# Patient Record
Sex: FEMALE
[De-identification: names, ages, dates, MRNs, and addresses within clinical notes are randomized per-mention and may not be internally consistent; named-entity substitution may affect disease eponyms.]

---

## 2017-11-01 ENCOUNTER — RESULT REVIEW (OUTPATIENT)
Age: 82
End: 2017-11-01

## 2018-06-09 ENCOUNTER — EMERGENCY (EMERGENCY)
Facility: HOSPITAL | Age: 83
LOS: 1 days | Discharge: HOME | End: 2018-06-09
Attending: EMERGENCY MEDICINE
Payer: MEDICARE

## 2018-06-09 VITALS
OXYGEN SATURATION: 100 % | WEIGHT: 160.06 LBS | HEART RATE: 101 BPM | DIASTOLIC BLOOD PRESSURE: 48 MMHG | HEIGHT: 64 IN | TEMPERATURE: 98 F | RESPIRATION RATE: 18 BRPM | SYSTOLIC BLOOD PRESSURE: 65 MMHG

## 2018-06-09 LAB
ANION GAP SERPL CALC-SCNC: 10 MMOL/L — SIGNIFICANT CHANGE UP (ref 5–17)
APTT BLD: 28.2 SEC — SIGNIFICANT CHANGE UP (ref 27.5–37.4)
BASOPHILS # BLD AUTO: 0.06 K/UL — SIGNIFICANT CHANGE UP (ref 0–0.2)
BASOPHILS NFR BLD AUTO: 0.6 % — SIGNIFICANT CHANGE UP (ref 0–2)
BUN SERPL-MCNC: 18 MG/DL — SIGNIFICANT CHANGE UP (ref 7–23)
CALCIUM SERPL-MCNC: 8.9 MG/DL — SIGNIFICANT CHANGE UP (ref 8.5–10.1)
CHLORIDE SERPL-SCNC: 104 MMOL/L — SIGNIFICANT CHANGE UP (ref 96–108)
CO2 SERPL-SCNC: 26 MMOL/L — SIGNIFICANT CHANGE UP (ref 22–31)
CREAT SERPL-MCNC: 1 MG/DL — SIGNIFICANT CHANGE UP (ref 0.5–1.3)
EOSINOPHIL # BLD AUTO: 0.49 K/UL — SIGNIFICANT CHANGE UP (ref 0–0.5)
EOSINOPHIL NFR BLD AUTO: 4.9 % — SIGNIFICANT CHANGE UP (ref 0–6)
GLUCOSE SERPL-MCNC: 167 MG/DL — HIGH (ref 70–99)
HCT VFR BLD CALC: 34.4 % — LOW (ref 34.5–45)
HGB BLD-MCNC: 11.9 G/DL — SIGNIFICANT CHANGE UP (ref 11.5–15.5)
IMM GRANULOCYTES NFR BLD AUTO: 0.5 % — SIGNIFICANT CHANGE UP (ref 0–1.5)
INR BLD: 0.96 RATIO — SIGNIFICANT CHANGE UP (ref 0.88–1.16)
LYMPHOCYTES # BLD AUTO: 3.11 K/UL — SIGNIFICANT CHANGE UP (ref 1–3.3)
LYMPHOCYTES # BLD AUTO: 31 % — SIGNIFICANT CHANGE UP (ref 13–44)
MCHC RBC-ENTMCNC: 30.4 PG — SIGNIFICANT CHANGE UP (ref 27–34)
MCHC RBC-ENTMCNC: 34.6 GM/DL — SIGNIFICANT CHANGE UP (ref 32–36)
MCV RBC AUTO: 87.8 FL — SIGNIFICANT CHANGE UP (ref 80–100)
MONOCYTES # BLD AUTO: 0.85 K/UL — SIGNIFICANT CHANGE UP (ref 0–0.9)
MONOCYTES NFR BLD AUTO: 8.5 % — SIGNIFICANT CHANGE UP (ref 2–14)
NEUTROPHILS # BLD AUTO: 5.47 K/UL — SIGNIFICANT CHANGE UP (ref 1.8–7.4)
NEUTROPHILS NFR BLD AUTO: 54.5 % — SIGNIFICANT CHANGE UP (ref 43–77)
NRBC # BLD: 0 /100 WBCS — SIGNIFICANT CHANGE UP (ref 0–0)
PLATELET # BLD AUTO: 303 K/UL — SIGNIFICANT CHANGE UP (ref 150–400)
POTASSIUM SERPL-MCNC: 3.9 MMOL/L — SIGNIFICANT CHANGE UP (ref 3.5–5.3)
POTASSIUM SERPL-SCNC: 3.9 MMOL/L — SIGNIFICANT CHANGE UP (ref 3.5–5.3)
PROTHROM AB SERPL-ACNC: 10.5 SEC — SIGNIFICANT CHANGE UP (ref 9.8–12.7)
RBC # BLD: 3.92 M/UL — SIGNIFICANT CHANGE UP (ref 3.8–5.2)
RBC # FLD: 13.3 % — SIGNIFICANT CHANGE UP (ref 10.3–14.5)
SODIUM SERPL-SCNC: 140 MMOL/L — SIGNIFICANT CHANGE UP (ref 135–145)
WBC # BLD: 10.03 K/UL — SIGNIFICANT CHANGE UP (ref 3.8–10.5)
WBC # FLD AUTO: 10.03 K/UL — SIGNIFICANT CHANGE UP (ref 3.8–10.5)

## 2018-06-09 PROCEDURE — 70450 CT HEAD/BRAIN W/O DYE: CPT | Mod: 26

## 2018-06-09 PROCEDURE — 72125 CT NECK SPINE W/O DYE: CPT | Mod: 26

## 2018-06-09 PROCEDURE — 12002 RPR S/N/AX/GEN/TRNK2.6-7.5CM: CPT

## 2018-06-09 PROCEDURE — 99284 EMERGENCY DEPT VISIT MOD MDM: CPT | Mod: 25

## 2018-06-09 RX ORDER — ASPIRIN/CALCIUM CARB/MAGNESIUM 324 MG
1 TABLET ORAL
Qty: 0 | Refills: 0 | COMMUNITY

## 2018-06-09 NOTE — ED ADULT NURSE NOTE - PMH
Aortic stenosis, moderate    Breast cancer  s/p left lumpectomy x 2, radiation 1999  Colon polyp    Diverticulosis    Dyslipidemia    Female incontinence  pt wears pessary  HTN (hypertension)    Murmur, cardiac    Osteopenia

## 2018-06-09 NOTE — ED ADULT NURSE NOTE - OBJECTIVE STATEMENT
Patient presents to ED with laceration to left side of head noted with dressing as stated she missed a step of stairs and sustained a laceration denies passing out accompanied by family waiting for MD evaluation.

## 2018-06-09 NOTE — ED ADULT TRIAGE NOTE - CHIEF COMPLAINT QUOTE
pt missed a concrete step and fell forward left scalp lac -LOC pt missed a concrete step and fell forward left scalp lac -LOC, near syncope in triage

## 2018-06-10 VITALS
SYSTOLIC BLOOD PRESSURE: 132 MMHG | DIASTOLIC BLOOD PRESSURE: 81 MMHG | TEMPERATURE: 98 F | HEART RATE: 98 BPM | RESPIRATION RATE: 16 BRPM | OXYGEN SATURATION: 100 %

## 2018-06-10 LAB
HCT VFR BLD CALC: 30.3 % — LOW (ref 34.5–45)
HGB BLD-MCNC: 10.5 G/DL — LOW (ref 11.5–15.5)
LACTATE SERPL-SCNC: 2 MMOL/L — SIGNIFICANT CHANGE UP (ref 0.7–2)
MCHC RBC-ENTMCNC: 30.4 PG — SIGNIFICANT CHANGE UP (ref 27–34)
MCHC RBC-ENTMCNC: 34.7 GM/DL — SIGNIFICANT CHANGE UP (ref 32–36)
MCV RBC AUTO: 87.8 FL — SIGNIFICANT CHANGE UP (ref 80–100)
NRBC # BLD: 0 /100 WBCS — SIGNIFICANT CHANGE UP (ref 0–0)
PLATELET # BLD AUTO: 264 K/UL — SIGNIFICANT CHANGE UP (ref 150–400)
RBC # BLD: 3.45 M/UL — LOW (ref 3.8–5.2)
RBC # FLD: 13.2 % — SIGNIFICANT CHANGE UP (ref 10.3–14.5)
WBC # BLD: 14.59 K/UL — HIGH (ref 3.8–10.5)
WBC # FLD AUTO: 14.59 K/UL — HIGH (ref 3.8–10.5)

## 2018-06-10 PROCEDURE — 85730 THROMBOPLASTIN TIME PARTIAL: CPT

## 2018-06-10 PROCEDURE — 99284 EMERGENCY DEPT VISIT MOD MDM: CPT | Mod: 25

## 2018-06-10 PROCEDURE — 80048 BASIC METABOLIC PNL TOTAL CA: CPT

## 2018-06-10 PROCEDURE — 85610 PROTHROMBIN TIME: CPT

## 2018-06-10 PROCEDURE — 12002 RPR S/N/AX/GEN/TRNK2.6-7.5CM: CPT

## 2018-06-10 PROCEDURE — 70450 CT HEAD/BRAIN W/O DYE: CPT

## 2018-06-10 PROCEDURE — 83605 ASSAY OF LACTIC ACID: CPT

## 2018-06-10 PROCEDURE — 36415 COLL VENOUS BLD VENIPUNCTURE: CPT

## 2018-06-10 PROCEDURE — 72125 CT NECK SPINE W/O DYE: CPT

## 2018-06-10 PROCEDURE — 85027 COMPLETE CBC AUTOMATED: CPT

## 2018-06-10 PROCEDURE — 96374 THER/PROPH/DIAG INJ IV PUSH: CPT

## 2018-06-10 RX ORDER — SODIUM CHLORIDE 9 MG/ML
500 INJECTION INTRAMUSCULAR; INTRAVENOUS; SUBCUTANEOUS ONCE
Qty: 0 | Refills: 0 | Status: COMPLETED | OUTPATIENT
Start: 2018-06-10 | End: 2018-06-10

## 2018-06-10 RX ADMIN — Medication 0.5 MILLIGRAM(S): at 00:50

## 2018-06-10 RX ADMIN — Medication 1 TABLET(S): at 01:23

## 2018-06-10 RX ADMIN — SODIUM CHLORIDE 1000 MILLILITER(S): 9 INJECTION INTRAMUSCULAR; INTRAVENOUS; SUBCUTANEOUS at 01:59

## 2018-06-10 RX ADMIN — SODIUM CHLORIDE 500 MILLILITER(S): 9 INJECTION INTRAMUSCULAR; INTRAVENOUS; SUBCUTANEOUS at 02:13

## 2018-06-10 NOTE — ED PROVIDER NOTE - EYES, MLM
Clear bilaterally, pupils equal, round and reactive to light. mild left supraorbital ecchymosis, no bony ttp or deformity

## 2018-06-10 NOTE — ED PROVIDER NOTE - MEDICAL DECISION MAKING DETAILS
never used pt with fall on street, no loc but laceration left frontal temporal forehead with pulsatile capillary bleeding. wound closed for hemastasis by me, pt to fu with plastics 1-2 days for definitive wound care, check ct head neg, check labs ro severe anemia, vss

## 2018-06-10 NOTE — ED PROVIDER NOTE - CARE PLAN
Principal Discharge DX:	Traumatic injury of head, initial encounter  Secondary Diagnosis:	Laceration of face with complication, initial encounter

## 2018-06-10 NOTE — ED PROVIDER NOTE - SKIN, MLM
Skin normal color for race, warm, dry 3cm gaping laceration lateral to left eyebrow and above with pulsatile blood flow controlled with pressure

## 2018-06-10 NOTE — ED PROVIDER NOTE - PSH
History of total knee replacement  s/p bilateral knee replacements  S/P breast lumpectomy  left breast lumpectomy with excision of axillary lymph nodes 1999  S/P carotid endarterectomy  left on 9/20/13  S/P tonsillectomy

## 2018-06-10 NOTE — ED PROVIDER NOTE - CHPI ED SYMPTOMS NEG
no loss of consciousness/no weakness/no vomiting/no tingling/no abrasion/no deformity/no fever/no confusion/no numbness

## 2018-06-10 NOTE — ED PROVIDER NOTE - NOTES
requested that I achieve hemostasis with pressure or sutures and abx and he will see pt in 1-2 days for definitive cosmetic repair.  instructed to give pt his cell #

## 2018-06-10 NOTE — ED PROVIDER NOTE - OBJECTIVE STATEMENT
Pt is a 83 yo female on asa. pt with hx of as. pt sp trip and fall in street and hit her head on concrete. no loc, neck pain, numbness, weakness, n/v or any other sx.  pt with heavy bleeding from left side of head, wrapped in field. pt feels mildly lighheaded.  bleeding controlled with pressure, no other co

## 2018-06-10 NOTE — ED PROVIDER NOTE - PROGRESS NOTE DETAILS
pt lactate only 2 after described heavy bleeding. hgb decreased 1 gram after 1 liter ivf.  will give 500cc more ns and if pt not dizzy and normal orthostatics, will d/c

## 2019-12-03 ENCOUNTER — RESULT REVIEW (OUTPATIENT)
Age: 84
End: 2019-12-03

## 2022-11-15 ENCOUNTER — OUTPATIENT (OUTPATIENT)
Dept: OUTPATIENT SERVICES | Facility: HOSPITAL | Age: 87
LOS: 1 days | Discharge: HOME | End: 2022-11-15

## 2022-11-15 DIAGNOSIS — D64.9 ANEMIA, UNSPECIFIED: ICD-10-CM

## 2023-02-05 ENCOUNTER — OUTPATIENT (OUTPATIENT)
Dept: OUTPATIENT SERVICES | Facility: HOSPITAL | Age: 88
LOS: 1 days | Discharge: HOME | End: 2023-02-05

## 2023-02-05 DIAGNOSIS — D64.9 ANEMIA, UNSPECIFIED: ICD-10-CM

## 2023-02-13 ENCOUNTER — APPOINTMENT (OUTPATIENT)
Dept: HEMATOLOGY ONCOLOGY | Facility: CLINIC | Age: 88
End: 2023-02-13
Payer: MEDICARE

## 2023-02-13 VITALS
WEIGHT: 161.14 LBS | HEART RATE: 103 BPM | BODY MASS INDEX: 29.28 KG/M2 | RESPIRATION RATE: 17 BRPM | OXYGEN SATURATION: 99 % | TEMPERATURE: 97.3 F | HEIGHT: 62.2 IN

## 2023-02-13 VITALS — DIASTOLIC BLOOD PRESSURE: 99 MMHG | SYSTOLIC BLOOD PRESSURE: 170 MMHG

## 2023-02-13 DIAGNOSIS — I50.32 CHRONIC DIASTOLIC (CONGESTIVE) HEART FAILURE: ICD-10-CM

## 2023-02-13 DIAGNOSIS — C50.919 MALIGNANT NEOPLASM OF UNSPECIFIED SITE OF UNSPECIFIED FEMALE BREAST: ICD-10-CM

## 2023-02-13 DIAGNOSIS — K21.9 GASTRO-ESOPHAGEAL REFLUX DISEASE W/OUT ESOPHAGITIS: ICD-10-CM

## 2023-02-13 DIAGNOSIS — M15.9 POLYOSTEOARTHRITIS, UNSPECIFIED: ICD-10-CM

## 2023-02-13 DIAGNOSIS — K58.0 IRRITABLE BOWEL SYNDROME WITH DIARRHEA: ICD-10-CM

## 2023-02-13 PROCEDURE — 99213 OFFICE O/P EST LOW 20 MIN: CPT

## 2023-02-13 NOTE — ASSESSMENT
[FreeTextEntry1] : 88 yo woman with history of Left breast invasive carcinoma in 1999; treated with radiation only; no endocrine therapy prescribed at that time.\par \par 12/2019 found to have new Left breast Invasive carcinoma; s/p lumpectomy and started on anastrazole by 4/2020.\par \par Overall doing very well with improved range of motion: continue to remain active and symptoms will continue but will be less prominent \par \par - plan to continue endocrine thearpy for 7-10 years\par - last bone densily - patient thinks she might have in in [facility] pharmacy but unsure when; will obtain repors\par - breast imaing as per Dr. Ortiz; reviewed mammo/sono from 10/3/23 by which time, plan for repeat in 10/2023\par - labs today including chem panel and Vitamin D levels\par - Patient had the opportunity to have all their questions answered to their satisfaction \par - follow up in 6 months \par

## 2023-02-13 NOTE — HISTORY OF PRESENT ILLNESS
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 She underwent lumpectomy but no RT due to prior exposure to radiation of the left chest wall. Started endocrine therapy with anastrazole in 4/2020 with recommendation to continue at least  for 5 years. \par \par Transferring care from [facility] to  [facility] as of 2/2023. [de-identified] : ER+AZ+ her2 negative [de-identified] : 2/13/2023\par All of the patient's prior records including radiology, pathology and prior notes reviewed; Past Medical History, Past Surgical History, Family History and Social history reviewed and updated in the patient's chart.\par \par Labs mammo/sono 10/2022 - [facility]\par Bone Denisty ??? at [facility]\par Tolerating anastrazole - although she does have some joint complaints

## 2023-02-13 NOTE — PHYSICAL EXAM
[Normal] : affect appropriate [de-identified] : well healed left breast incision; no palpable nodularity, no nipple retraction no skin dimpling; right breast with no new palpable findings [de-identified] : shotty posteior  and anterior cervical LNs

## 2023-05-14 RX ORDER — ANASTROZOLE TABLETS 1 MG/1
1 TABLET ORAL
Qty: 90 | Refills: 1 | Status: ACTIVE | COMMUNITY
Start: 2022-10-17 | End: 1900-01-01

## 2023-08-15 ENCOUNTER — APPOINTMENT (OUTPATIENT)
Dept: HEMATOLOGY ONCOLOGY | Facility: CLINIC | Age: 88
End: 2023-08-15

## 2024-07-18 ENCOUNTER — EMERGENCY (EMERGENCY)
Facility: HOSPITAL | Age: 89
LOS: 1 days | Discharge: HOME | End: 2024-07-18
Attending: EMERGENCY MEDICINE | Admitting: EMERGENCY MEDICINE
Payer: MEDICARE

## 2024-07-18 VITALS
DIASTOLIC BLOOD PRESSURE: 88 MMHG | TEMPERATURE: 98 F | RESPIRATION RATE: 18 BRPM | SYSTOLIC BLOOD PRESSURE: 150 MMHG | OXYGEN SATURATION: 98 % | HEART RATE: 88 BPM

## 2024-07-18 VITALS
SYSTOLIC BLOOD PRESSURE: 180 MMHG | OXYGEN SATURATION: 97 % | RESPIRATION RATE: 18 BRPM | DIASTOLIC BLOOD PRESSURE: 96 MMHG | TEMPERATURE: 98 F | WEIGHT: 156.97 LBS | HEIGHT: 64 IN | HEART RATE: 95 BPM

## 2024-07-18 PROCEDURE — 72192 CT PELVIS W/O DYE: CPT | Mod: 26,MC

## 2024-07-18 PROCEDURE — 93971 EXTREMITY STUDY: CPT

## 2024-07-18 PROCEDURE — 93971 EXTREMITY STUDY: CPT | Mod: 26,59,LT

## 2024-07-18 PROCEDURE — 93971 EXTREMITY STUDY: CPT | Mod: 26,LT,77

## 2024-07-18 PROCEDURE — 99284 EMERGENCY DEPT VISIT MOD MDM: CPT | Mod: 25

## 2024-07-18 PROCEDURE — 99284 EMERGENCY DEPT VISIT MOD MDM: CPT | Mod: FS

## 2024-07-18 PROCEDURE — 72192 CT PELVIS W/O DYE: CPT | Mod: MC

## 2024-07-18 NOTE — ED ADULT NURSE NOTE - OBJECTIVE STATEMENT
Pt states that she has been having worsening left pelvic pain. pt states that she has also noticed BLLE swelling and left arm swelling. pt denies SOB, CP, fever, chills, and  s/s. pt in no acute distress. pt updated on plan of care.

## 2024-07-18 NOTE — ED ADULT NURSE NOTE - NSICDXPASTMEDICALHX_GEN_ALL_CORE_FT
PAST MEDICAL HISTORY:  Aortic stenosis, moderate     Breast cancer s/p left lumpectomy x 2, radiation 1999    Colon polyp     Diverticulosis     Dyslipidemia     Female incontinence pt wears pessary    HTN (hypertension)     Murmur, cardiac     Osteopenia

## 2024-07-18 NOTE — ED ADULT NURSE NOTE - NSICDXPASTSURGICALHX_GEN_ALL_CORE_FT
PAST SURGICAL HISTORY:  History of total knee replacement s/p bilateral knee replacements    S/P breast lumpectomy left breast lumpectomy with excision of axillary lymph nodes 1999    S/P carotid endarterectomy left on 9/20/13    S/P tonsillectomy

## 2024-07-18 NOTE — ED PROVIDER NOTE - NSFOLLOWUPINSTRUCTIONS_ED_ALL_ED_FT
Follow up with your orthopedic doctor in 2 weeks as scheduled. Take the copy of your test results you were given in the emergency room for your doctor to review.     Stay hydrated    Return to the ER if your symptoms worsen or for any other medical emergencies

## 2024-07-18 NOTE — ED ADULT NURSE NOTE - NSFALLRISKINTERV_ED_ALL_ED

## 2024-07-18 NOTE — ED PROVIDER NOTE - PHYSICAL EXAMINATION
Gen: Well appearing in NAD.   Head: atraumatic  Heart: s1/s2, RRR  Lung: CTA b/l  Abd: soft, NT/ND, no rebound or guarding  Msk: No C-spine or mid spinal tenderness to palpation.  Full range of motion of hips bilaterally.  No obvious deformities noted.  Patient amatory in the ED.  + Mild swelling on the left arm.  2+ pedal edema bilaterally. No redness or signs of infection  Neuro: AAO x3, patient moving all extremity equally, no focal neuro deficits noted  Skin: Normal for race. No bruising, epidural site is well appearing   Psych: Calm and cooperative

## 2024-07-18 NOTE — ED PROVIDER NOTE - ATTENDING APP SHARED VISIT CONTRIBUTION OF CARE
Examination reveals a well-developed well-nourished elderly white female in no acute distress with swelling/1+ pitting edema left lower extremity and left upper extremity.  Pelvis is stable and nontender.  Hips are stable and nontender.  I agree with plan management outlined by PA.

## 2024-07-18 NOTE — ED PROVIDER NOTE - PATIENT PORTAL LINK FT
You can access the FollowMyHealth Patient Portal offered by [facility] by registering at the following website: [email or web address]. By joining [facility]’s FollowMyHealth portal, you will also be able to view your health information using other applications (apps) compatible with our system.

## 2024-07-18 NOTE — ED PROVIDER NOTE - PROGRESS NOTE DETAILS
LEOPOLDO Miranda: Results reviewed with patient and daughter at bedside.  Duplex negative for DVT in the left arm and left leg.  CT results reviewed no fractures noted, tracking soft tissue gas at the left paraspinal region likely from recent epidural injection patient has no clinical signs of infection
no constipation/no abdominal pain/no diarrhea/no melena

## 2024-07-18 NOTE — ED PROVIDER NOTE - CLINICAL SUMMARY MEDICAL DECISION MAKING FREE TEXT BOX
Patient is an 88-year-old white female with history of breast CVA as well as chronic back pain/questionable spinal stenosis underwent spinal epidural for pain relief this week here for evaluation of low back pain slightly worse and slightly different in location i.e. now in groin when in the past was more in posterior thighs.  In addition patient complaining of slight swelling left upper extremity and left lower extremity.  No chest pain no shortness of breath.  No fever no chills no cough.  This case will require independent history physical which I did perform myself followed by CT scan of the pelvis and Doppler of the left upper and left lower extremity.

## 2024-07-18 NOTE — ED PROVIDER NOTE - OBJECTIVE STATEMENT
88-year-old female past medical history of chronic back pain radiates to the pelvic region recently received an epidural injection 2 days ago by orthopedic Dr. Colunga.  Her daughter brought her in because she was concerned about the pain, wanted to r/o pelvic fractures and patient also noticed left arm and left leg swelling x few days, which her daughter was concerned could be a blood clot.  Patient denies any chest pain, shortness of breath, abdominal pain, paresthesias or all other complaints.  Reports her back pain and pelvic pain is improved and has been able to ambulate up and down the ED without any pain.

## 2025-09-17 ENCOUNTER — TRANSCRIPTION ENCOUNTER (OUTPATIENT)
Age: 89
End: 2025-09-17